# Patient Record
Sex: MALE | Race: BLACK OR AFRICAN AMERICAN | NOT HISPANIC OR LATINO | Employment: FULL TIME | URBAN - METROPOLITAN AREA
[De-identification: names, ages, dates, MRNs, and addresses within clinical notes are randomized per-mention and may not be internally consistent; named-entity substitution may affect disease eponyms.]

---

## 2023-07-13 ENCOUNTER — HOSPITAL ENCOUNTER (EMERGENCY)
Facility: HOSPITAL | Age: 28
Discharge: HOME | End: 2023-07-13
Attending: INTERNAL MEDICINE

## 2023-07-13 ENCOUNTER — APPOINTMENT (EMERGENCY)
Dept: ULTRASOUND IMAGING | Facility: HOSPITAL | Age: 28
End: 2023-07-13

## 2023-07-13 VITALS
TEMPERATURE: 98.6 F | WEIGHT: 160 LBS | SYSTOLIC BLOOD PRESSURE: 123 MMHG | RESPIRATION RATE: 18 BRPM | DIASTOLIC BLOOD PRESSURE: 69 MMHG | OXYGEN SATURATION: 98 % | HEART RATE: 64 BPM

## 2023-07-13 DIAGNOSIS — N45.1 EPIDIDYMITIS: Primary | ICD-10-CM

## 2023-07-13 DIAGNOSIS — N50.811 PAIN IN RIGHT TESTICLE: ICD-10-CM

## 2023-07-13 LAB
BILIRUB UR QL STRIP: NEGATIVE
CLARITY UR: CLEAR
COLOR UR: NORMAL
GLUCOSE UR STRIP-MCNC: NEGATIVE MG/DL
HGB UR QL STRIP.AUTO: NEGATIVE
KETONES UR STRIP-MCNC: NEGATIVE MG/DL
LEUKOCYTE ESTERASE UR QL STRIP: NEGATIVE
NITRITE UR QL STRIP: NEGATIVE
PH UR STRIP.AUTO: 6.5 [PH]
PROT UR STRIP-MCNC: NEGATIVE MG/DL
SP GR UR STRIP.AUTO: 1.03 (ref 1–1.03)
UROBILINOGEN UR STRIP-ACNC: <2 MG/DL

## 2023-07-13 PROCEDURE — 99284 EMERGENCY DEPT VISIT MOD MDM: CPT | Performed by: PHYSICIAN ASSISTANT

## 2023-07-13 PROCEDURE — 76870 US EXAM SCROTUM: CPT | Performed by: PHYSICIAN ASSISTANT

## 2023-07-13 PROCEDURE — 96372 THER/PROPH/DIAG INJ SC/IM: CPT

## 2023-07-13 PROCEDURE — 81003 URINALYSIS AUTO W/O SCOPE: CPT | Performed by: PHYSICIAN ASSISTANT

## 2023-07-13 PROCEDURE — 87591 N.GONORRHOEAE DNA AMP PROB: CPT | Performed by: PHYSICIAN ASSISTANT

## 2023-07-13 PROCEDURE — 76870 US EXAM SCROTUM: CPT

## 2023-07-13 PROCEDURE — 87491 CHLMYD TRACH DNA AMP PROBE: CPT | Performed by: PHYSICIAN ASSISTANT

## 2023-07-13 PROCEDURE — 99283 EMERGENCY DEPT VISIT LOW MDM: CPT

## 2023-07-13 RX ORDER — DOXYCYCLINE HYCLATE 100 MG/1
100 CAPSULE ORAL ONCE
Status: COMPLETED | OUTPATIENT
Start: 2023-07-13 | End: 2023-07-13

## 2023-07-13 RX ADMIN — CEFTRIAXONE 500 MG: 1 INJECTION, POWDER, FOR SOLUTION INTRAMUSCULAR; INTRAVENOUS at 19:09

## 2023-07-13 RX ADMIN — DOXYCYCLINE 100 MG: 100 CAPSULE ORAL at 19:09

## 2023-07-13 NOTE — ED PROVIDER NOTES
History  Chief Complaint   Patient presents with   • Groin Pain     Patient here for eval of groin pain that started 2 days ago when he noticed his Rt testicle larger than the other. Pt states swelling has subsided but now feels a lump. Denies rashes/discharge. Pain with ambulation and on palpation. History provided by:  Patient  Testicle Pain  Location:  Right testicle  Severity:  Moderate  Onset quality:  Gradual  Duration:  2 days  Timing:  Constant  Progression:  Waxing and waning  Chronicity:  New  Context:  Patien presents with a 2 day history of painful swelling of the right testicle  Relieved by:  Nothing  Worsened by:  Palpation, movement  Associated symptoms: no abdominal pain, no chest pain, no congestion, no cough, no diarrhea, no ear pain, no fatigue, no fever, no headaches, no loss of consciousness, no myalgias, no nausea, no rash, no rhinorrhea, no shortness of breath, no sore throat, no vomiting and no wheezing        None       History reviewed. No pertinent past medical history. History reviewed. No pertinent surgical history. History reviewed. No pertinent family history. I have reviewed and agree with the history as documented. E-Cigarette/Vaping     E-Cigarette/Vaping Substances     Social History     Tobacco Use   • Smoking status: Some Days     Types: Cigarettes   • Smokeless tobacco: Never       Review of Systems   Constitutional: Positive for activity change. Negative for appetite change, chills, diaphoresis, fatigue and fever. HENT: Negative for congestion, ear pain, rhinorrhea and sore throat. Respiratory: Negative for cough, shortness of breath and wheezing. Cardiovascular: Negative for chest pain. Gastrointestinal: Negative for abdominal pain, diarrhea, nausea and vomiting. Genitourinary: Positive for scrotal swelling and testicular pain. Negative for dysuria, frequency, hematuria and urgency. Musculoskeletal: Negative for myalgias.    Skin: Negative for color change and rash. Neurological: Negative for loss of consciousness and headaches. Psychiatric/Behavioral: Negative for confusion. All other systems reviewed and are negative. Physical Exam  Physical Exam  Vitals and nursing note reviewed. Constitutional:       General: He is not in acute distress. Appearance: Normal appearance. He is normal weight. He is not ill-appearing, toxic-appearing or diaphoretic. HENT:      Head: Normocephalic and atraumatic. Right Ear: External ear normal.      Left Ear: External ear normal.   Eyes:      General:         Right eye: No discharge. Left eye: No discharge. Conjunctiva/sclera: Conjunctivae normal.   Cardiovascular:      Rate and Rhythm: Normal rate and regular rhythm. Pulmonary:      Effort: Pulmonary effort is normal.      Breath sounds: Normal breath sounds. Abdominal:      Tenderness: There is no abdominal tenderness. There is no guarding or rebound. Genitourinary:     Penis: Normal.       Comments: Tender swelling right epididymis. Testicles not swollen. It is tender at the insertion of the epididymis on the testes. No hernia palpated. Musculoskeletal:      Cervical back: Neck supple. Skin:     General: Skin is warm. Capillary Refill: Capillary refill takes less than 2 seconds. Neurological:      Mental Status: He is alert. Psychiatric:         Mood and Affect: Mood normal.         Behavior: Behavior normal.         Thought Content:  Thought content normal.         Judgment: Judgment normal.         Vital Signs  ED Triage Vitals   Temperature Pulse Respirations Blood Pressure SpO2   07/13/23 1720 07/13/23 1715 07/13/23 1715 07/13/23 1715 07/13/23 1715   98.6 °F (37 °C) 64 18 123/69 98 %      Temp Source Heart Rate Source Patient Position - Orthostatic VS BP Location FiO2 (%)   07/13/23 1720 07/13/23 1715 07/13/23 1715 07/13/23 1715 --   Oral Monitor Sitting Left arm       Pain Score       07/13/23 1715       5 Vitals:    07/13/23 1715   BP: 123/69   Pulse: 64   Patient Position - Orthostatic VS: Sitting         Visual Acuity      ED Medications  Medications   cefTRIAXone (ROCEPHIN) 500 mg in lidocaine (PF) (XYLOCAINE-MPF) 1 % IM only syringe (500 mg Intramuscular Given 7/13/23 1909)   doxycycline hyclate (VIBRAMYCIN) capsule 100 mg (100 mg Oral Given 7/13/23 1909)       Diagnostic Studies  Results Reviewed     Procedure Component Value Units Date/Time    UA w Reflex to Microscopic w Reflex to Culture [[id number]] Collected: 07/13/23 1848    Lab Status: Final result Specimen: Urine, Clean Catch Updated: 07/13/23 1857     Color, UA Light Yellow     Clarity, UA Clear     Specific Gravity, UA 1.026     pH, UA 6.5     Leukocytes, UA Negative     Nitrite, UA Negative     Protein, UA Negative mg/dl      Glucose, UA Negative mg/dl      Ketones, UA Negative mg/dl      Urobilinogen, UA <2.0 mg/dl      Bilirubin, UA Negative     Occult Blood, UA Negative    Chlamydia/GC amplified DNA by PCR [[id number]] Collected: 07/13/23 1848    Lab Status: In process Specimen: Urine, Other Updated: 07/13/23 1851                 US scrotum and groin area   ED Interpretation by Faby Hamlin PA-C (07/13 1837)   RIGHT testis = 3.8 x 2.2 x 2.7 cm. Volume 11.6 mL  Normal contour with homogeneous smooth echotexture. No intratesticular mass lesion or calcifications.     LEFT testis = 3.8 x 2.1 x 2.9 cm. Volume 11.6 mL  Normal contour with homogeneous smooth echotexture. No intratesticular mass lesion or calcifications.     Doppler flow within both testes is present and symmetric.     EPIDIDYMIDES:  There is increased size of the right epididymal tail which appears heterogeneous in echogenicity and demonstrating increased vascularity suspicious for epididymitis.     HYDROCELE:  No significant fluid present.     VARICOCELE:  None present.     SCROTUM:  Scrotal thickness and appearance within normal limits.  No evidence for extratesticular mass or hernia demonstrated. Small bilateral groin lymph nodes incidentally seen.     IMPRESSION:     No discrete intratesticular mass or evidence of acute testicular torsion.     Enlarged right epididymal tail with prominent increased vascularity suspicious for    right-sided epididymitis. Clinical correlation and follow-up advised.     Workstation performed: [id number]           Final Result by Shakira Day MD (07/13 1827)      No discrete intratesticular mass or evidence of acute testicular torsion. Enlarged right epididymal tail with prominent increased vascularity suspicious for right-sided epididymitis. Clinical correlation and follow-up advised. Workstation performed: [id number]                    Procedures  Procedures         ED Course                                             Medical Decision Making  This 29-year-old male presents emergency room with a 2-day history of a painful swelling in his right testicle. He noticed a lump. He states it is tender. He is a  and sits frequently throughout the day. It hurts when he is sitting. It also hurts when he walks. He denies any dysuria, frequency, urgency, hematuria. He denies any discharge from his penis or pain with ejaculation. He denies any fever or chills. He denies any injury. Physical exam this 29-year-old black male is alert and oriented x3. He is in no acute distress. His airway is patent. He has good respiratory exchange. His abdomen is soft nondistended nontender without mass or hepatosplenomegaly. He has full range of motion of both hips in all planes. His legs are aligned. Inspection of his right groin and scrotal area. He does have some swelling and a palpable fullness on the right epididymis left testicle is normal position. Is nontender upon palpation. There is no palpable hernias present. Epididymitis    Plan- patient was given a shot of IM Rocephin and doxycycline 100 mg twice daily for 7 days.   She can wear a briefs as a scrotal support for comfort. He is to practice safe sex for the next 21 days. He is to notify his partner possible exposure to an STI. Epididymitis: acute illness or injury  Pain in right testicle: acute illness or injury  Amount and/or Complexity of Data Reviewed  Labs: ordered. Details: We will notify the patient of abnormalities and results. Radiology: ordered. Details: Independently interpreted by the radiologist.  No signs of a torsion. Acute right epididymitis      Risk  Prescription drug management. Disposition  Final diagnoses:   Epididymitis   Pain in right testicle     Time reflects when diagnosis was documented in both MDM as applicable and the Disposition within this note     Time User Action Codes Description Comment    7/13/2023  6:47 PM Dolph [place] Add [N45.1] Epididymitis     7/13/2023  6:47 PM Dolph [place] Add [N50.811] Pain in right testicle       ED Disposition     ED Disposition   Discharge    Condition   Stable    Date/Time   Thu Jul 13, 2023  6:47 PM    [address] discharge to home/self care. Follow-up Information    None         There are no discharge medications for this patient. No discharge procedures on file.     PDMP Review     None          ED Provider  Electronically Signed by           Jade Cosby PA-C  07/13/23 1956

## 2023-07-14 LAB
C TRACH DNA SPEC QL NAA+PROBE: POSITIVE
N GONORRHOEA DNA SPEC QL NAA+PROBE: NEGATIVE

## 2023-08-29 ENCOUNTER — HOSPITAL ENCOUNTER (EMERGENCY)
Facility: HOSPITAL | Age: 28
Discharge: HOME | End: 2023-08-29

## 2023-08-29 VITALS
WEIGHT: 154 LBS | TEMPERATURE: 98 F | BODY MASS INDEX: 25.66 KG/M2 | OXYGEN SATURATION: 97 % | RESPIRATION RATE: 18 BRPM | HEIGHT: 65 IN | HEART RATE: 103 BPM | DIASTOLIC BLOOD PRESSURE: 79 MMHG | SYSTOLIC BLOOD PRESSURE: 121 MMHG

## 2023-08-29 DIAGNOSIS — M25.551 PAIN OF RIGHT HIP: Primary | ICD-10-CM

## 2023-08-29 DIAGNOSIS — R52 PAIN: ICD-10-CM

## 2023-08-29 PROCEDURE — 99283 PR EMERGENCY DEPT VISIT,LEVEL III: ICD-10-PCS | Mod: ,,,

## 2023-08-29 PROCEDURE — 99284 EMERGENCY DEPT VISIT MOD MDM: CPT

## 2023-08-29 PROCEDURE — 96372 THER/PROPH/DIAG INJ SC/IM: CPT

## 2023-08-29 PROCEDURE — 63600175 PHARM REV CODE 636 W HCPCS

## 2023-08-29 PROCEDURE — 99283 EMERGENCY DEPT VISIT LOW MDM: CPT | Mod: ,,,

## 2023-08-29 RX ORDER — DEXAMETHASONE SODIUM PHOSPHATE 4 MG/ML
4 INJECTION, SOLUTION INTRA-ARTICULAR; INTRALESIONAL; INTRAMUSCULAR; INTRAVENOUS; SOFT TISSUE
Status: COMPLETED | OUTPATIENT
Start: 2023-08-29 | End: 2023-08-29

## 2023-08-29 RX ORDER — METHYLPREDNISOLONE 4 MG/1
TABLET ORAL
Qty: 1 EACH | Refills: 0 | Status: SHIPPED | OUTPATIENT
Start: 2023-08-29 | End: 2023-09-19

## 2023-08-29 RX ORDER — KETOROLAC TROMETHAMINE 30 MG/ML
60 INJECTION, SOLUTION INTRAMUSCULAR; INTRAVENOUS
Status: COMPLETED | OUTPATIENT
Start: 2023-08-29 | End: 2023-08-29

## 2023-08-29 RX ORDER — AMOXICILLIN 500 MG/1
500 CAPSULE ORAL
COMMUNITY

## 2023-08-29 RX ORDER — MELOXICAM 7.5 MG/1
7.5 TABLET ORAL DAILY
Qty: 15 TABLET | Refills: 0 | Status: SHIPPED | OUTPATIENT
Start: 2023-08-29

## 2023-08-29 RX ADMIN — KETOROLAC TROMETHAMINE 60 MG: 30 INJECTION, SOLUTION INTRAMUSCULAR at 11:08

## 2023-08-29 RX ADMIN — DEXAMETHASONE SODIUM PHOSPHATE 4 MG: 4 INJECTION, SOLUTION INTRA-ARTICULAR; INTRALESIONAL; INTRAMUSCULAR; INTRAVENOUS; SOFT TISSUE at 11:08

## 2023-08-30 NOTE — ED NOTES
Patient ambulated into Ed, awake and alert with c/o right hip pain with ambulation and standing. No other issues voiced.

## 2023-08-30 NOTE — ED PROVIDER NOTES
Encounter Date: 8/29/2023       History     Chief Complaint   Patient presents with    Hip Pain     Right hip pain x1 week     Patient is a 26 y/o AAM who presents to the Emergency Department with c/o right hip pain. Patient stated that his symptoms began one week ago, denies any injury and/or radiation of pain.     The history is provided by the patient.   Hip Pain  This is a new problem. The current episode started more than 1 week ago. The problem occurs daily. The problem has not changed since onset.Pertinent negatives include no chest pain, no abdominal pain, no headaches and no shortness of breath. The symptoms are aggravated by walking. Nothing relieves the symptoms. He has tried acetaminophen for the symptoms. The treatment provided no relief.     Review of patient's allergies indicates:  No Known Allergies  No past medical history on file.  No past surgical history on file.  No family history on file.     Review of Systems   Constitutional: Negative.    HENT: Negative.     Eyes: Negative.    Respiratory:  Negative for shortness of breath.    Cardiovascular:  Negative for chest pain.   Gastrointestinal:  Negative for abdominal pain.   Endocrine: Negative.    Musculoskeletal:  Positive for arthralgias, gait problem and myalgias.   Skin: Negative.    Allergic/Immunologic: Negative.    Neurological:  Negative for headaches.   Hematological: Negative.    Psychiatric/Behavioral: Negative.         Physical Exam     Initial Vitals [08/29/23 2303]   BP Pulse Resp Temp SpO2   121/79 103 18 98.2 °F (36.8 °C) 97 %      MAP       --         Physical Exam    Nursing note and vitals reviewed.  Constitutional: Vital signs are normal. He appears well-developed and well-nourished. He is not diaphoretic. He is cooperative.  Non-toxic appearance. He does not have a sickly appearance. He does not appear ill. No distress.   Cardiovascular:  Normal rate, regular rhythm, S1 normal, S2 normal, intact distal pulses and normal  pulses.     Exam reveals no gallop, no S3, no S4, no distant heart sounds and no friction rub.       No murmur heard.  No systolic murmur is present.  Pulmonary/Chest: Effort normal and breath sounds normal.   Abdominal: Abdomen is soft and flat. Bowel sounds are normal. There is no abdominal tenderness. No hernia.   Musculoskeletal:      Right hip: Tenderness present. No deformity, lacerations, bony tenderness or crepitus. Decreased range of motion. Normal strength.     Lymphadenopathy:     He has no cervical adenopathy.     He has no axillary adenopathy.   Neurological: He is alert and oriented to person, place, and time. He has normal strength and normal reflexes. He displays normal reflexes. No cranial nerve deficit or sensory deficit. He displays a negative Romberg sign. GCS eye subscore is 4. GCS verbal subscore is 5. GCS motor subscore is 6.   Skin: Skin is warm, dry and intact. Capillary refill takes less than 2 seconds. No rash noted.   Psychiatric: He has a normal mood and affect. His speech is normal and behavior is normal. Judgment and thought content normal. Cognition and memory are normal.         Medical Screening Exam   See Full Note    ED Course   Procedures  Labs Reviewed - No data to display       Imaging Results              X-Ray Hip 2 or 3 views Right (with Pelvis when performed) (Preliminary result)  Result time 08/29/23 23:18:56      Wet Read by Elmo Sullivan FNP (08/29/23 23:18:56, [facility] - Emergency Department, Emergency Medicine)    No acute fracture or dislocation noted.                                      Medications   ketorolac injection 60 mg (60 mg Intramuscular Given 8/29/23 2318)   dexAMETHasone injection 4 mg (4 mg Intramuscular Given 8/29/23 2318)     Medical Decision Making  Amount and/or Complexity of Data Reviewed  Radiology: ordered and independent interpretation performed.    Risk  Prescription drug management.               ED Course as of 08/29/23 2319    Tue Aug 29, 2023   [id number] Discharge instructions given along with strict return precautions, patient verbalizes understanding.   [AC]      ED Course User Index  [AC] Elmo Sullivan FNP               Medical Decision Making:   Initial Assessment:   Patient is a 28 y/o AAM who presents to the Emergency Department with c/o right hip pain. Patient stated that his symptoms began one week ago, denies any injury and/or radiation of pain.     The history is provided by the patient.   Hip Pain  This is a new problem. The current episode started more than 1 week ago. The problem occurs daily. The problem has not changed since onset.Pertinent negatives include no chest pain, no abdominal pain, no headaches and no shortness of breath. The symptoms are aggravated by walking. Nothing relieves the symptoms. He has tried acetaminophen for the symptoms. The treatment provided no relief.   Physical Exam    Nursing note and vitals reviewed.  Constitutional: Vital signs are normal. He appears well-developed and well-nourished. He is not diaphoretic. He is cooperative.  Non-toxic appearance. He does not have a sickly appearance. He does not appear ill. No distress.   Cardiovascular:  Normal rate, regular rhythm, S1 normal, S2 normal, intact distal pulses and normal pulses.     Exam reveals no gallop, no S3, no S4, no distant heart sounds and no friction rub.       No murmur heard.  No systolic murmur is present.  Pulmonary/Chest: Effort normal and breath sounds normal.   Abdominal: Abdomen is soft and flat. Bowel sounds are normal. There is no abdominal tenderness. No hernia.   Musculoskeletal:      Right hip: Tenderness present. No deformity, lacerations, bony tenderness or crepitus. Decreased range of motion. Normal strength.     Lymphadenopathy:     He has no cervical adenopathy.     He has no axillary adenopathy.   Neurological: He is alert and oriented to person, place, and time. He has normal strength and normal reflexes. He  displays normal reflexes. No cranial nerve deficit or sensory deficit. He displays a negative Romberg sign. GCS eye subscore is 4. GCS verbal subscore is 5. GCS motor subscore is 6.   Skin: Skin is warm, dry and intact. Capillary refill takes less than 2 seconds. No rash noted.   Psychiatric: He has a normal mood and affect. His speech is normal and behavior is normal. Judgment and thought content normal. Cognition and memory are normal.       Differential Diagnosis:   Right hip pain   Clinical Tests:   Radiological Study: Ordered and Reviewed  ED Management:  Toradol 60 mg IM   Decadron 4 mg IM  RX for medrol dose pack and mobic to take as directed  Patient discharged home to follow up with pcp       Clinical Impression:   Final diagnoses:  [R52] Pain  [M25.551] Pain of right hip (Primary)               Elmo Sullivan FNP  08/29/23 1451

## 2023-08-30 NOTE — DISCHARGE INSTRUCTIONS
Take medication as directed by the label on the bottle, no additional motrin while taking Mobic, follow up with your local pcp as needed if symptoms continue, return to the ER if pain worsens.